# Patient Record
Sex: FEMALE | Race: OTHER | HISPANIC OR LATINO | Employment: FULL TIME | URBAN - METROPOLITAN AREA
[De-identification: names, ages, dates, MRNs, and addresses within clinical notes are randomized per-mention and may not be internally consistent; named-entity substitution may affect disease eponyms.]

---

## 2023-07-31 ENCOUNTER — APPOINTMENT (EMERGENCY)
Dept: CT IMAGING | Facility: HOSPITAL | Age: 53
End: 2023-07-31
Payer: COMMERCIAL

## 2023-07-31 ENCOUNTER — APPOINTMENT (OUTPATIENT)
Dept: NON INVASIVE DIAGNOSTICS | Facility: HOSPITAL | Age: 53
End: 2023-07-31
Payer: COMMERCIAL

## 2023-07-31 ENCOUNTER — HOSPITAL ENCOUNTER (OUTPATIENT)
Facility: HOSPITAL | Age: 53
Setting detail: OBSERVATION
Discharge: HOME | End: 2023-08-01
Attending: EMERGENCY MEDICINE | Admitting: INTERNAL MEDICINE
Payer: COMMERCIAL

## 2023-07-31 ENCOUNTER — APPOINTMENT (OUTPATIENT)
Dept: MRI IMAGING | Facility: HOSPITAL | Age: 53
End: 2023-07-31
Payer: COMMERCIAL

## 2023-07-31 DIAGNOSIS — R00.2 PALPITATIONS: Primary | ICD-10-CM

## 2023-07-31 DIAGNOSIS — R29.90 STROKE-LIKE SYMPTOMS: ICD-10-CM

## 2023-07-31 DIAGNOSIS — I63.9 STROKE (CEREBRUM) (HCC): ICD-10-CM

## 2023-07-31 LAB
2HR DELTA HS TROPONIN: >0 NG/L
4HR DELTA HS TROPONIN: >0 NG/L
ANION GAP SERPL CALCULATED.3IONS-SCNC: 7 MMOL/L
AORTIC ROOT: 3 CM
AORTIC VALVE MEAN VELOCITY: 10.3 M/S
APICAL FOUR CHAMBER EJECTION FRACTION: 61 %
APTT PPP: 25 SECONDS (ref 23–37)
ATRIAL RATE: 83 BPM
AV AREA BY CONTINUOUS VTI: 1.6 CM2
AV AREA PEAK VELOCITY: 1.6 CM2
AV LVOT MEAN GRADIENT: 2 MMHG
AV LVOT PEAK GRADIENT: 3 MMHG
AV MEAN GRADIENT: 4 MMHG
AV PEAK GRADIENT: 6 MMHG
AV VALVE AREA: 1.98 CM2
AV VELOCITY RATIO: 0.69
BUN SERPL-MCNC: 10 MG/DL (ref 5–25)
CALCIUM SERPL-MCNC: 9 MG/DL (ref 8.4–10.2)
CARDIAC TROPONIN I PNL SERPL HS: 2 NG/L
CARDIAC TROPONIN I PNL SERPL HS: 2 NG/L
CARDIAC TROPONIN I PNL SERPL HS: <2 NG/L
CHLORIDE SERPL-SCNC: 106 MMOL/L (ref 96–108)
CO2 SERPL-SCNC: 25 MMOL/L (ref 21–32)
CREAT SERPL-MCNC: 0.84 MG/DL (ref 0.6–1.3)
DOP CALC AO PEAK VEL: 1.27 M/S
DOP CALC AO VTI: 23.87 CM
DOP CALC LVOT AREA: 2.83 CM2
DOP CALC LVOT CARDIAC INDEX: 1.34 L/MIN/M2
DOP CALC LVOT CARDIAC OUTPUT: 2.69 L/MIN
DOP CALC LVOT DIAMETER: 1.9 CM
DOP CALC LVOT PEAK VEL VTI: 16.64 CM
DOP CALC LVOT PEAK VEL: 0.88 M/S
DOP CALC LVOT STROKE INDEX: 18.5 ML/M2
DOP CALC LVOT STROKE VOLUME: 47.16 CM3
E WAVE DECELERATION TIME: 215 MS
ERYTHROCYTE [DISTWIDTH] IN BLOOD BY AUTOMATED COUNT: 12.6 % (ref 11.6–15.1)
FLUAV RNA RESP QL NAA+PROBE: NEGATIVE
FLUBV RNA RESP QL NAA+PROBE: NEGATIVE
FRACTIONAL SHORTENING: 30 (ref 28–44)
GFR SERPL CREATININE-BSD FRML MDRD: 80 ML/MIN/1.73SQ M
GLUCOSE SERPL-MCNC: 118 MG/DL (ref 65–140)
GLUCOSE SERPL-MCNC: 118 MG/DL (ref 65–140)
HCT VFR BLD AUTO: 39.5 % (ref 34.8–46.1)
HGB BLD-MCNC: 13.1 G/DL (ref 11.5–15.4)
INR PPP: 0.87 (ref 0.84–1.19)
INTERVENTRICULAR SEPTUM IN DIASTOLE (PARASTERNAL SHORT AXIS VIEW): 1 CM
INTERVENTRICULAR SEPTUM: 1 CM (ref 0.6–1.1)
LAAS-AP4: 16.1 CM2
LEFT ATRIUM AREA SYSTOLE SINGLE PLANE A4C: 15.6 CM2
LEFT ATRIUM SIZE: 3.1 CM
LEFT INTERNAL DIMENSION IN SYSTOLE: 2.6 CM (ref 2.1–4)
LEFT VENTRICULAR INTERNAL DIMENSION IN DIASTOLE: 3.7 CM (ref 3.5–6)
LEFT VENTRICULAR POSTERIOR WALL IN END DIASTOLE: 1 CM
LEFT VENTRICULAR STROKE VOLUME: 33 ML
LVSV (TEICH): 33 ML
MCH RBC QN AUTO: 29.3 PG (ref 26.8–34.3)
MCHC RBC AUTO-ENTMCNC: 33.2 G/DL (ref 31.4–37.4)
MCV RBC AUTO: 88 FL (ref 82–98)
MV E'TISSUE VEL-LAT: 15 CM/S
MV E'TISSUE VEL-SEP: 8 CM/S
MV PEAK A VEL: 0.65 M/S
MV PEAK E VEL: 58 CM/S
MV STENOSIS PRESSURE HALF TIME: 62 MS
MV VALVE AREA P 1/2 METHOD: 3.55
P AXIS: 50 DEGREES
PLATELET # BLD AUTO: 335 THOUSANDS/UL (ref 149–390)
PMV BLD AUTO: 9.4 FL (ref 8.9–12.7)
POTASSIUM SERPL-SCNC: 3.6 MMOL/L (ref 3.5–5.3)
PR INTERVAL: 134 MS
PROTHROMBIN TIME: 12.2 SECONDS (ref 11.6–14.5)
QRS AXIS: -1 DEGREES
QRSD INTERVAL: 74 MS
QT INTERVAL: 386 MS
QTC INTERVAL: 453 MS
RBC # BLD AUTO: 4.47 MILLION/UL (ref 3.81–5.12)
RIGHT ATRIUM AREA SYSTOLE A4C: 15.5 CM2
RIGHT VENTRICLE ID DIMENSION: 3.9 CM
RSV RNA RESP QL NAA+PROBE: NEGATIVE
SARS-COV-2 RNA RESP QL NAA+PROBE: NEGATIVE
SL CV LV EF: 60
SL CV PED ECHO LEFT VENTRICLE DIASTOLIC VOLUME (MOD BIPLANE) 2D: 58 ML
SL CV PED ECHO LEFT VENTRICLE SYSTOLIC VOLUME (MOD BIPLANE) 2D: 26 ML
SODIUM SERPL-SCNC: 138 MMOL/L (ref 135–147)
T WAVE AXIS: 38 DEGREES
TR MAX PG: 22 MMHG
TR PEAK VELOCITY: 2.3 M/S
TRICUSPID ANNULAR PLANE SYSTOLIC EXCURSION: 1.9 CM
TRICUSPID VALVE PEAK REGURGITATION VELOCITY: 2.34 M/S
TSH SERPL DL<=0.05 MIU/L-ACNC: 1.59 UIU/ML (ref 0.45–4.5)
VENTRICULAR RATE: 83 BPM
WBC # BLD AUTO: 6.99 THOUSAND/UL (ref 4.31–10.16)

## 2023-07-31 PROCEDURE — 99244 OFF/OP CNSLTJ NEW/EST MOD 40: CPT | Performed by: PSYCHIATRY & NEUROLOGY

## 2023-07-31 PROCEDURE — 84443 ASSAY THYROID STIM HORMONE: CPT | Performed by: EMERGENCY MEDICINE

## 2023-07-31 PROCEDURE — 70551 MRI BRAIN STEM W/O DYE: CPT

## 2023-07-31 PROCEDURE — 0241U HB NFCT DS VIR RESP RNA 4 TRGT: CPT

## 2023-07-31 PROCEDURE — 85730 THROMBOPLASTIN TIME PARTIAL: CPT

## 2023-07-31 PROCEDURE — 99285 EMERGENCY DEPT VISIT HI MDM: CPT

## 2023-07-31 PROCEDURE — 93306 TTE W/DOPPLER COMPLETE: CPT

## 2023-07-31 PROCEDURE — 36415 COLL VENOUS BLD VENIPUNCTURE: CPT

## 2023-07-31 PROCEDURE — 93306 TTE W/DOPPLER COMPLETE: CPT | Performed by: STUDENT IN AN ORGANIZED HEALTH CARE EDUCATION/TRAINING PROGRAM

## 2023-07-31 PROCEDURE — 99285 EMERGENCY DEPT VISIT HI MDM: CPT | Performed by: EMERGENCY MEDICINE

## 2023-07-31 PROCEDURE — 80061 LIPID PANEL: CPT | Performed by: INTERNAL MEDICINE

## 2023-07-31 PROCEDURE — 99222 1ST HOSP IP/OBS MODERATE 55: CPT | Performed by: INTERNAL MEDICINE

## 2023-07-31 PROCEDURE — 84484 ASSAY OF TROPONIN QUANT: CPT

## 2023-07-31 PROCEDURE — 93005 ELECTROCARDIOGRAM TRACING: CPT

## 2023-07-31 PROCEDURE — 82948 REAGENT STRIP/BLOOD GLUCOSE: CPT

## 2023-07-31 PROCEDURE — 93010 ELECTROCARDIOGRAM REPORT: CPT | Performed by: STUDENT IN AN ORGANIZED HEALTH CARE EDUCATION/TRAINING PROGRAM

## 2023-07-31 PROCEDURE — 80048 BASIC METABOLIC PNL TOTAL CA: CPT

## 2023-07-31 PROCEDURE — 96374 THER/PROPH/DIAG INJ IV PUSH: CPT

## 2023-07-31 PROCEDURE — 70496 CT ANGIOGRAPHY HEAD: CPT

## 2023-07-31 PROCEDURE — 85027 COMPLETE CBC AUTOMATED: CPT

## 2023-07-31 PROCEDURE — NC001 PR NO CHARGE: Performed by: PSYCHIATRY & NEUROLOGY

## 2023-07-31 PROCEDURE — 83036 HEMOGLOBIN GLYCOSYLATED A1C: CPT | Performed by: INTERNAL MEDICINE

## 2023-07-31 PROCEDURE — 85610 PROTHROMBIN TIME: CPT

## 2023-07-31 PROCEDURE — 70498 CT ANGIOGRAPHY NECK: CPT

## 2023-07-31 RX ORDER — ENOXAPARIN SODIUM 100 MG/ML
40 INJECTION SUBCUTANEOUS DAILY
Status: DISCONTINUED | OUTPATIENT
Start: 2023-07-31 | End: 2023-08-01 | Stop reason: HOSPADM

## 2023-07-31 RX ORDER — SIMETHICONE 80 MG
80 TABLET,CHEWABLE ORAL 4 TIMES DAILY PRN
Status: DISCONTINUED | OUTPATIENT
Start: 2023-07-31 | End: 2023-08-01 | Stop reason: HOSPADM

## 2023-07-31 RX ORDER — LORAZEPAM 2 MG/ML
1 INJECTION INTRAMUSCULAR ONCE
Status: COMPLETED | OUTPATIENT
Start: 2023-07-31 | End: 2023-07-31

## 2023-07-31 RX ORDER — ASPIRIN 325 MG
325 TABLET ORAL ONCE
Status: COMPLETED | OUTPATIENT
Start: 2023-07-31 | End: 2023-07-31

## 2023-07-31 RX ORDER — POLYETHYLENE GLYCOL 3350 17 G/17G
17 POWDER, FOR SOLUTION ORAL DAILY PRN
Status: DISCONTINUED | OUTPATIENT
Start: 2023-07-31 | End: 2023-08-01 | Stop reason: HOSPADM

## 2023-07-31 RX ORDER — ONDANSETRON 2 MG/ML
4 INJECTION INTRAMUSCULAR; INTRAVENOUS EVERY 6 HOURS PRN
Status: DISCONTINUED | OUTPATIENT
Start: 2023-07-31 | End: 2023-08-01 | Stop reason: HOSPADM

## 2023-07-31 RX ORDER — ACETAMINOPHEN 325 MG/1
650 TABLET ORAL EVERY 4 HOURS PRN
Status: DISCONTINUED | OUTPATIENT
Start: 2023-07-31 | End: 2023-08-01 | Stop reason: HOSPADM

## 2023-07-31 RX ORDER — ASPIRIN 81 MG/1
81 TABLET, CHEWABLE ORAL DAILY
Status: DISCONTINUED | OUTPATIENT
Start: 2023-08-01 | End: 2023-08-01 | Stop reason: HOSPADM

## 2023-07-31 RX ORDER — ATORVASTATIN CALCIUM 40 MG/1
40 TABLET, FILM COATED ORAL EVERY EVENING
Status: DISCONTINUED | OUTPATIENT
Start: 2023-07-31 | End: 2023-08-01 | Stop reason: HOSPADM

## 2023-07-31 RX ADMIN — ASPIRIN 325 MG ORAL TABLET 325 MG: 325 PILL ORAL at 11:51

## 2023-07-31 RX ADMIN — ENOXAPARIN SODIUM 40 MG: 40 INJECTION SUBCUTANEOUS at 12:56

## 2023-07-31 RX ADMIN — LORAZEPAM 1 MG: 2 INJECTION INTRAMUSCULAR; INTRAVENOUS at 11:15

## 2023-07-31 RX ADMIN — ATORVASTATIN CALCIUM 40 MG: 40 TABLET, FILM COATED ORAL at 18:28

## 2023-07-31 RX ADMIN — IOHEXOL 85 ML: 350 INJECTION, SOLUTION INTRAVENOUS at 11:05

## 2023-07-31 RX ADMIN — ACETAMINOPHEN 650 MG: 325 TABLET ORAL at 18:28

## 2023-07-31 NOTE — ED PROVIDER NOTES
History  Chief Complaint   Patient presents with   • Dizziness     Dizziness that started at 8am, left sided facial tingling that started at 9am with left leg weakness. Pt was at work when symptoms started. Pt has hx of anxiety and anemia     69-year-old female presenting emerged department with 4:30 AM onset of left leg numbness and palpitations that has since progressed to left facial tingling and left upper extremity tingling as well. Also notes left facial droop. Having some dizziness associated with this. Feels anxious. No history of anxiety. No nausea vomit diarrhea pain no chest pain shortness breath no vision changes. Prior to Admission Medications   Prescriptions Last Dose Informant Patient Reported? Taking? Cholecalciferol (VITAMIN D PO) 2023  Yes Yes   Sig: Take by mouth   Cyanocobalamin (B-12 PO) 2023  Yes Yes   Sig: Take by mouth   IRON PO 2023  Yes Yes   Sig: Take by mouth   famotidine (PEPCID) 20 mg tablet   No No   Sig: Take 1 tablet by mouth 2 (two) times a day for 28 doses   lidocaine (LIDODERM) 5 % Not Taking  No No   Sig: Apply 1 patch topically daily Remove & Discard patch within 12 hours or as directed by MD   Patient not taking: Reported on 2023   methocarbamol (ROBAXIN) 500 mg tablet Not Taking  No No   Sig: Take 1 tablet (500 mg total) by mouth 2 (two) times a day as needed for muscle spasms   Patient not taking: Reported on 2023   predniSONE 10 mg tablet Not Taking  No No   Sig: Take 5 tabs po x 2 days; 4 tabs po x 2 days; 3 tabs po x 1 day; 2 tabs po x 1 day; 1 tab po x 1 day. Patient not taking: Reported on 2023      Facility-Administered Medications: None       History reviewed. No pertinent past medical history. Past Surgical History:   Procedure Laterality Date   •  SECTION     • CHOLECYSTECTOMY     • GASTRIC BYPASS     • TUBAL LIGATION         History reviewed. No pertinent family history.   I have reviewed and agree with the history as documented. E-Cigarette/Vaping   • E-Cigarette Use Never User      E-Cigarette/Vaping Substances     Social History     Tobacco Use   • Smoking status: Never   • Smokeless tobacco: Never   Vaping Use   • Vaping Use: Never used   Substance Use Topics   • Alcohol use: No   • Drug use: Never       Review of Systems   Constitutional: Negative for chills and fever. HENT: Negative for ear pain and sore throat. Eyes: Negative for pain and visual disturbance. Respiratory: Negative for cough and shortness of breath. Cardiovascular: Negative for chest pain and palpitations. Gastrointestinal: Negative for abdominal pain and vomiting. Genitourinary: Negative for dysuria and hematuria. Musculoskeletal: Negative for arthralgias and back pain. Skin: Negative for color change and rash. Neurological: Positive for dizziness, weakness and numbness. Negative for seizures and syncope. All other systems reviewed and are negative. Physical Exam  Physical Exam  Vitals and nursing note reviewed. Constitutional:       General: She is not in acute distress. Appearance: She is well-developed. HENT:      Head: Normocephalic and atraumatic. Eyes:      Conjunctiva/sclera: Conjunctivae normal.   Cardiovascular:      Rate and Rhythm: Normal rate and regular rhythm. Heart sounds: No murmur heard. Pulmonary:      Effort: Pulmonary effort is normal. No respiratory distress. Breath sounds: Normal breath sounds. Abdominal:      Palpations: Abdomen is soft. Tenderness: There is no abdominal tenderness. Musculoskeletal:         General: No swelling. Cervical back: Neck supple. Skin:     General: Skin is warm and dry. Capillary Refill: Capillary refill takes less than 2 seconds. Neurological:      Mental Status: She is alert and oriented to person, place, and time. Cranial Nerves: Cranial nerve deficit present. Motor: Weakness present.       Comments: NIH stroke scale of 2 due to left facial numbness as well as mild left facial droop. 5-5 strength of bilateral upper and lower extremities. Sensation to light touch of bilateral upper and lower Extremities.    Psychiatric:         Mood and Affect: Mood normal.         Vital Signs  ED Triage Vitals   Temperature Pulse Respirations Blood Pressure SpO2   07/31/23 1044 07/31/23 1044 07/31/23 1044 07/31/23 1044 07/31/23 1044   98.6 °F (37 °C) 77 20 129/67 100 %      Temp Source Heart Rate Source Patient Position - Orthostatic VS BP Location FiO2 (%)   07/31/23 1044 07/31/23 1044 07/31/23 1044 07/31/23 1044 --   Tympanic Monitor Lying Left arm       Pain Score       07/31/23 1055       No Pain           Vitals:    07/31/23 1118 07/31/23 1241 07/31/23 1341 07/31/23 1441   BP: 134/84 111/75 101/58 104/66   Pulse: 71 68 78 77   Patient Position - Orthostatic VS: Lying Lying Lying Lying         Visual Acuity  Visual Acuity    Flowsheet Row Most Recent Value   L Pupil Size (mm) 3   R Pupil Size (mm) 3   L Pupil Shape Round   R Pupil Shape Round          ED Medications  Medications   acetaminophen (TYLENOL) tablet 650 mg (has no administration in time range)   polyethylene glycol (MIRALAX) packet 17 g (has no administration in time range)   ondansetron (ZOFRAN) injection 4 mg (has no administration in time range)   simethicone (MYLICON) chewable tablet 80 mg (has no administration in time range)   atorvastatin (LIPITOR) tablet 40 mg (has no administration in time range)   enoxaparin (LOVENOX) subcutaneous injection 40 mg (40 mg Subcutaneous Given 7/31/23 1256)   aspirin chewable tablet 81 mg (has no administration in time range)   LORazepam (ATIVAN) injection 1 mg (1 mg Intravenous Given 7/31/23 1115)   iohexol (OMNIPAQUE) 350 MG/ML injection (SINGLE-DOSE) 85 mL (85 mL Intravenous Given 7/31/23 1105)   aspirin tablet 325 mg (325 mg Oral Given 7/31/23 1151)       Diagnostic Studies  Results Reviewed     Procedure Component Value Units Date/Time    HS Troponin I 4hr [[id number]] Collected: 07/31/23 1439    Lab Status: In process Specimen: Blood from Arm, Right Updated: 07/31/23 1446    HS Troponin I 2hr [[id number]]  (Normal) Collected: 07/31/23 1310    Lab Status: Final result Specimen: Blood from Arm, Right Updated: 07/31/23 1343     hs TnI 2hr 2 ng/L      Delta 2hr hsTnI >0 ng/L     FLU/RSV/COVID - if FLU/RSV clinically relevant [[id number]]  (Normal) Collected: 07/31/23 1112    Lab Status: Final result Specimen: Nares from Nose Updated: 07/31/23 1205     SARS-CoV-2 Negative     INFLUENZA A PCR Negative     INFLUENZA B PCR Negative     RSV PCR Negative    Narrative:      FOR PEDIATRIC PATIENTS - copy/paste COVID Guidelines URL to browser: [email or web address]    SARS-CoV-2 assay is a Nucleic Acid Amplification assay intended for the  qualitative detection of nucleic acid from SARS-CoV-2 in nasopharyngeal  swabs. Results are for the presumptive identification of SARS-CoV-2 RNA. Positive results are indicative of infection with SARS-CoV-2, the virus  causing COVID-19, but do not rule out bacterial infection or co-infection  with other viruses. Laboratories within the [facility] and its  territories are required to report all positive results to the appropriate  public health authorities. Negative results do not preclude SARS-CoV-2  infection and should not be used as the sole basis for treatment or other  patient management decisions. Negative results must be combined with  clinical observations, patient history, and epidemiological information. This test has not been FDA cleared or approved. This test has been authorized by FDA under an Emergency Use Authorization  (EUA).  This test is only authorized for the duration of time the  declaration that circumstances exist justifying the authorization of the  emergency use of an in vitro diagnostic tests for detection of SARS-CoV-2  virus and/or diagnosis of COVID-19 infection under section 564(b)(1) of  the Act, 21 U. S.C. 183FXH-8(U)(2), unless the authorization is terminated  or revoked sooner. The test has been validated but independent review by FDA  and CLIA is pending. Test performed using [employer] GeneXpert: This RT-PCR assay targets N2,  a region unique to SARS-CoV-2. A conserved region in the E-gene was chosen  for pan-Sarbecovirus detection which includes SARS-CoV-2. According to CMS-2020-01-R, this platform meets the definition of high-throughput technology.     TSH, 3rd generation with Free T4 reflex [[id number]]  (Normal) Collected: 07/31/23 1054    Lab Status: Final result Specimen: Blood from Arm, Left Updated: 07/31/23 1149     TSH 3RD GENERATON 1.594 uIU/mL     HS Troponin 0hr (reflex protocol) [[id number]]  (Normal) Collected: 07/31/23 1054    Lab Status: Final result Specimen: Blood from Arm, Left Updated: 07/31/23 1124     hs TnI 0hr <2 ng/L     Fingerstick Glucose (POCT) [[id number]]  (Normal) Collected: 07/31/23 1052    Lab Status: Final result Updated: 07/31/23 1121     POC Glucose 118 mg/dl     Basic metabolic panel [[id number]] Collected: 07/31/23 1054    Lab Status: Final result Specimen: Blood from Arm, Left Updated: 07/31/23 1118     Sodium 138 mmol/L      Potassium 3.6 mmol/L      Chloride 106 mmol/L      CO2 25 mmol/L      ANION GAP 7 mmol/L      BUN 10 mg/dL      Creatinine 0.84 mg/dL      Glucose 118 mg/dL      Calcium 9.0 mg/dL      eGFR 80 ml/min/1.73sq m     Narrative:      [place] guidelines for Chronic Kidney Disease (CKD):   •  Stage 1 with normal or high GFR (GFR > 90 mL/min/1.73 square meters)  •  Stage 2 Mild CKD (GFR = 60-89 mL/min/1.73 square meters)  •  Stage 3A Moderate CKD (GFR = 45-59 mL/min/1.73 square meters)  •  Stage 3B Moderate CKD (GFR = 30-44 mL/min/1.73 square meters)  •  Stage 4 Severe CKD (GFR = 15-29 mL/min/1.73 square meters)  •  Stage 5 End Stage CKD (GFR <15 mL/min/1.73 square meters)  Note: GFR calculation is accurate only with a steady state creatinine    Protime-INR [[id number]]  (Normal) Collected: 07/31/23 1054    Lab Status: Final result Specimen: Blood from Arm, Left Updated: 07/31/23 1112     Protime 12.2 seconds      INR 0.87    APTT [[id number]]  (Normal) Collected: 07/31/23 1054    Lab Status: Final result Specimen: Blood from Arm, Left Updated: 07/31/23 1112     PTT 25 seconds     CBC and Platelet [[id number]]  (Normal) Collected: 07/31/23 1054    Lab Status: Final result Specimen: Blood from Arm, Left Updated: 07/31/23 1101     WBC 6.99 Thousand/uL      RBC 4.47 Million/uL      Hemoglobin 13.1 g/dL      Hematocrit 39.5 %      MCV 88 fL      MCH 29.3 pg      MCHC 33.2 g/dL      RDW 12.6 %      Platelets 925 Thousands/uL      MPV 9.4 fL                  MRI brain wo contrast   Final Result by HELENA Scanlon MD (07/31 1253)      No acute intracranial pathology. Workstation performed: [id number]         CTA stroke alert (head/neck)   Final Result by Ryan Li MD (07/31 1120)      1. Patent major vessels of the [place] of [place] without high-grade stenosis. No aneurysm. 2.  No hemodynamically significant stenosis of cervical carotid and vertebral arteries. Findings were directly discussed with Dr. Lucas Childress at 11:10 a.m. Workstation performed: [id number]         CT stroke alert brain   Final Result by Ryan Li MD (07/31 1117)      No acute intracranial CT abnormality. Findings were directly discussed with Dr. Lucas Childress at 11:10 a.m. Workstation performed: [id number]                    Procedures  Procedures         ED Course                               SBIRT 22yo+    Flowsheet Row Most Recent Value   Initial Alcohol Screen: US AUDIT-C     1. How often do you have a drink containing alcohol? 0 Filed at: 07/31/2023 1117   2.  How many drinks containing alcohol do you have on a typical day you are drinking? 0 Filed at: 07/31/2023 1117   3a. Male UNDER 65: How often do you have five or more drinks on one occasion? 0 Filed at: 07/31/2023 1117   3b. FEMALE Any Age, or MALE 65+: How often do you have 4 or more drinks on one occassion? 0 Filed at: 07/31/2023 1117   Audit-C Score 0 Filed at: 07/31/2023 1117   BETTY: How many times in the past year have you. .. Used an illegal drug or used a prescription medication for non-medical reasons? Never Filed at: 07/31/2023 1117                    Medical Decision Making  71-year-old female presenting emerged department with left facial droop and left facial numbness or tingling. Concern for stroke. CTA head and neck unremarkable. Case discussed with neurologist who recommends admission. Amount and/or Complexity of Data Reviewed  Labs: ordered. Risk  Prescription drug management. Decision regarding hospitalization. Disposition  Final diagnoses:   Palpitations   Stroke (cerebrum) ([address])     Time reflects when diagnosis was documented in both MDM as applicable and the Disposition within this note     Time User Action Codes Description Comment    7/31/2023 10:50 AM Derrick Pillion Add [R00.2] Palpitations     7/31/2023 11:37 AM Beto Goo Add [R29.90] Stroke-like symptoms     7/31/2023 11:41 AM Cydne Cocking Add [I63.9] Stroke (cerebrum) [facility])       ED Disposition     ED Disposition   Admit    Condition   Stable    Date/Time   Mon Jul 31, 2023 1141    Comment   Case was discussed with Dr. Josue James and the patient's admission status was agreed to be Admission Status: inpatient status to the service of Dr. Josue James .            Follow-up Information    None         Current Discharge Medication List      CONTINUE these medications which have NOT CHANGED    Details   Cholecalciferol (VITAMIN D PO) Take by mouth      Cyanocobalamin (B-12 PO) Take by mouth      IRON PO Take by mouth      famotidine (PEPCID) 20 mg tablet Take 1 tablet by mouth 2 (two) times a day for 28 doses  Qty: 28 tablet, Refills: 0      lidocaine (LIDODERM) 5 % Apply 1 patch topically daily Remove & Discard patch within 12 hours or as directed by MD  Qty: 6 patch, Refills: 0    Associated Diagnoses: Lumbar back pain with radiculopathy affecting right lower extremity      methocarbamol (ROBAXIN) 500 mg tablet Take 1 tablet (500 mg total) by mouth 2 (two) times a day as needed for muscle spasms  Qty: 20 tablet, Refills: 0    Associated Diagnoses: Lumbar back pain with radiculopathy affecting right lower extremity      predniSONE 10 mg tablet Take 5 tabs po x 2 days; 4 tabs po x 2 days; 3 tabs po x 1 day; 2 tabs po x 1 day; 1 tab po x 1 day. Qty: 24 tablet, Refills: 0    Associated Diagnoses: Lumbar back pain with radiculopathy affecting right lower extremity             No discharge procedures on file.     PDMP Review     None          ED Provider  Electronically Signed by           Gonzalo Son MD  07/31/23 6311

## 2023-07-31 NOTE — ASSESSMENT & PLAN NOTE
Presents to ED with left-sided facial numbness, arm and leg tingling and numbness   Symptoms noticed around 4:30 AM this morning, last well-known time was last night.   Patient was out of TNK window  • CTA head/neck showed no high-grade stenosis or aneurysm  • Admitted under stroke pathway  • Permissive hypertension  • DVT prophylaxis with Lovenox  • PT/OT/ST  •  mg once followed by 81 mg daily  • Atorvastatin 40 mg daily   • lipid panel/Hgb A1C ordered   • Telemetry, neuro checks   • Neurology consult; recommendations appreciated  • MRI brain and echocardiogram ordered

## 2023-07-31 NOTE — H&P
[facility]  H&P  Name: Shadi Torres 47 y.o. female I MRN: [id number]  Unit/Bed#: 7T [facility] 704-01 I Date of Admission: 7/31/2023   Date of Service: 7/31/2023 I Hospital Day: 0      Assessment/Plan   * Stroke-like symptoms  Assessment & Plan  Presents to ED with left-sided facial numbness, arm and leg tingling and numbness   Symptoms noticed around 4:30 AM this morning, last well-known time was last night. Patient was out of TNK window  • CTA head/neck showed no high-grade stenosis or aneurysm  • Admitted under stroke pathway  • Permissive hypertension  • DVT prophylaxis with Lovenox  • PT/OT/ST  •  mg once followed by 81 mg daily  • Atorvastatin 40 mg daily   • lipid panel/Hgb A1C ordered   • Telemetry, neuro checks   • Neurology consult; recommendations appreciated  • MRI brain and echocardiogram ordered             VTE Prophylaxis: Enoxaparin (Lovenox)  / sequential compression device   Code Status: Full code  POLST: There is no POLST form on file for this patient (pre-hospital)  Discussion with family: Discussed with patient    Anticipated Length of Stay:  Patient will be admitted on an Observation basis with an anticipated length of stay of less than 2 midnights. Justification for Hospital Stay: Strokelike symptoms    Total Time for Visit, including Counseling / Coordination of Care: 45 minutes. Greater than 50% of this total time spent on direct patient counseling and coordination of care. Chief Complaint:   Left-sided numbness with left facial droop    History of Present Illness:    Shadi Torres is a 47 y.o. female who presents with left-sided facial numbness with left-sided arm and leg tingling and numbness. She noticed symptoms around 4:30 AM this morning. Patient went to work however she felt worse with symptoms including light headache, loud noise in her left ear and palpitation. She felt weakness, tingling and numbness in left side of her body.   Patient reported she started crying after that. Patient denies any anxiety issue or medications. The last well-known time was last night therefore patient was out of TNK window. Patient had varicose vein and underwent surgery before. Since then she had pain syndrome. In ED, stroke alert called and neurologist recommended admission under stroke pathway. CTA head and neck showed no high-grade stenosis or aneurysm. Review of Systems:    Review of Systems Patient was seen and examined at bedside. The patient has left-sided tingling and numbness with left facial tingling. She denies any fever, chills, chest pain, palpitation, shortness of breath, N/V, abd pain. Past Medical and Surgical History:     History reviewed. No pertinent past medical history. Past Surgical History:   Procedure Laterality Date   •  SECTION     • CHOLECYSTECTOMY     • GASTRIC BYPASS     • TUBAL LIGATION         Meds/Allergies:    Prior to Admission medications    Medication Sig Start Date End Date Taking? Authorizing Provider   Cholecalciferol (VITAMIN D PO) Take by mouth    Historical Provider, MD   Cyanocobalamin (B-12 PO) Take by mouth    Historical Provider, MD   famotidine (PEPCID) 20 mg tablet Take 1 tablet by mouth 2 (two) times a day for 28 doses 17  Milka Costello MD   IRON PO Take by mouth    Historical Provider, MD   lidocaine (LIDODERM) 5 % Apply 1 patch topically daily Remove & Discard patch within 12 hours or as directed by MD 20   MELISSA Austin   methocarbamol (ROBAXIN) 500 mg tablet Take 1 tablet (500 mg total) by mouth 2 (two) times a day as needed for muscle spasms 20   MELISSA Austin   predniSONE 10 mg tablet Take 5 tabs po x 2 days; 4 tabs po x 2 days; 3 tabs po x 1 day; 2 tabs po x 1 day; 1 tab po x 1 day. 20   MELISSA Austin     I have reviewed home medications with patient personally.     Allergies: No Known Allergies    Social History:     Marital Status: /Civil Union   Patient Pre-hospital Living Situation: Lives at home  Patient Pre-hospital Level of Mobility: Independent  Patient Pre-hospital Diet Restrictions: No restrictions  Substance Use History:   Social History     Substance and Sexual Activity   Alcohol Use No     Social History     Tobacco Use   Smoking Status Never   Smokeless Tobacco Never     Social History     Substance and Sexual Activity   Drug Use Never       Family History:    History reviewed. No pertinent family history. Physical Exam:     Vitals:   Blood Pressure: 104/66 (07/31/23 1441)  Pulse: 77 (07/31/23 1441)  Temperature: 98.4 °F (36.9 °C) (07/31/23 1441)  Temp Source: Temporal (07/31/23 1441)  Respirations: 20 (07/31/23 1441)  Height: 5' 4" (162.6 cm) (07/31/23 1241)  Weight - Scale: 95 kg (209 lb 7 oz) (07/31/23 1241)  SpO2: 98 % (07/31/23 1441)    Physical Exam    General: no acute distress  HEENT: NC/AT, PERRL, EOM - normal  Neck: Supple  Pulm/Chest: Normal chest wall expansion, clear breath sounds on both side  CVS: RRR, normal S1&S2, capillary refill <2s  Abd: soft, non tender, non distended, bowel sounds +  MSK: move all 4 extremities spontaneously  Skin: warm  CNS: Left-sided facial tingling and numbness. Left-sided upper and lower extremity weakness 4/5. Left-sided of the body tingling and numbness. No facial droop. Additional Data:     Lab Results: I have personally reviewed pertinent reports.       Results from last 7 days   Lab Units 07/31/23  1054   WBC Thousand/uL 6.99   HEMOGLOBIN g/dL 13.1   HEMATOCRIT % 39.5   PLATELETS Thousands/uL 335     Results from last 7 days   Lab Units 07/31/23  1054   SODIUM mmol/L 138   POTASSIUM mmol/L 3.6   CHLORIDE mmol/L 106   CO2 mmol/L 25   BUN mg/dL 10   CREATININE mg/dL 0.84   ANION GAP mmol/L 7   CALCIUM mg/dL 9.0   GLUCOSE RANDOM mg/dL 118     Results from last 7 days   Lab Units 07/31/23  1054   INR  0.87     Results from last 7 days   Lab Units 07/31/23  1052   POC GLUCOSE mg/dl 118               Imaging: I have personally reviewed pertinent reports. MRI brain wo contrast   Final Result by HELENA Neumann MD (07/31 1253)      No acute intracranial pathology. Workstation performed: [id number]         CTA stroke alert (head/neck)   Final Result by Luz Girard MD (07/31 1120)      1. Patent major vessels of the [place] of [place] without high-grade stenosis. No aneurysm. 2.  No hemodynamically significant stenosis of cervical carotid and vertebral arteries. Findings were directly discussed with Dr. Khoi Izquierdo at 11:10 a.m. Workstation performed: [id number]         CT stroke alert brain   Final Result by Luz Girard MD (07/31 1117)      No acute intracranial CT abnormality. Findings were directly discussed with Dr. Khoi Izquierdo at 11:10 a.m. Workstation performed: [id number]             EKG, Pathology, and Other Studies Reviewed on Admission:   · EKG: No recent EKG in [facility]    Allscripts / [facility] Records Reviewed: Yes     ** Please Note: This note has been constructed using a voice recognition system.  **

## 2023-08-01 VITALS
TEMPERATURE: 97.4 F | HEIGHT: 64 IN | DIASTOLIC BLOOD PRESSURE: 65 MMHG | RESPIRATION RATE: 18 BRPM | OXYGEN SATURATION: 97 % | BODY MASS INDEX: 35.76 KG/M2 | WEIGHT: 209.44 LBS | SYSTOLIC BLOOD PRESSURE: 105 MMHG | HEART RATE: 76 BPM

## 2023-08-01 PROBLEM — G45.9 TIA (TRANSIENT ISCHEMIC ATTACK): Status: ACTIVE | Noted: 2023-07-31

## 2023-08-01 LAB
CHOLEST SERPL-MCNC: 219 MG/DL
EST. AVERAGE GLUCOSE BLD GHB EST-MCNC: 120 MG/DL
HBA1C MFR BLD: 5.8 %
HDLC SERPL-MCNC: 53 MG/DL
LDLC SERPL CALC-MCNC: 138 MG/DL (ref 0–100)
TRIGL SERPL-MCNC: 142 MG/DL

## 2023-08-01 PROCEDURE — 99239 HOSP IP/OBS DSCHRG MGMT >30: CPT | Performed by: INTERNAL MEDICINE

## 2023-08-01 PROCEDURE — 92610 EVALUATE SWALLOWING FUNCTION: CPT

## 2023-08-01 PROCEDURE — 97166 OT EVAL MOD COMPLEX 45 MIN: CPT

## 2023-08-01 PROCEDURE — 97162 PT EVAL MOD COMPLEX 30 MIN: CPT

## 2023-08-01 RX ORDER — ATORVASTATIN CALCIUM 40 MG/1
40 TABLET, FILM COATED ORAL EVERY EVENING
Qty: 30 TABLET | Refills: 0 | Status: SHIPPED | OUTPATIENT
Start: 2023-08-01 | End: 2023-08-31

## 2023-08-01 RX ORDER — ASPIRIN 81 MG/1
81 TABLET, CHEWABLE ORAL DAILY
Qty: 30 TABLET | Refills: 0 | Status: SHIPPED | OUTPATIENT
Start: 2023-08-01 | End: 2023-08-31

## 2023-08-01 RX ADMIN — ASPIRIN 81 MG 81 MG: 81 TABLET ORAL at 09:55

## 2023-08-01 NOTE — PLAN OF CARE
Problem: Potential for Falls  Goal: Patient will remain free of falls  Description: INTERVENTIONS:  - Educate patient/family on patient safety including physical limitations  - Instruct patient to call for assistance with activity   - Consult OT/PT to assist with strengthening/mobility   - Keep Call bell within reach  - Keep bed low and locked with side rails adjusted as appropriate  - Keep care items and personal belongings within reach  - Initiate and maintain comfort rounds  - Make Fall Risk Sign visible to staff  - Apply yellow socks and bracelet for high fall risk patients  - Consider moving patient to room near nurses station  Outcome: Progressing     Problem: PAIN - ADULT  Goal: Verbalizes/displays adequate comfort level or baseline comfort level  Description: Interventions:  - Encourage patient to monitor pain and request assistance  - Assess pain using appropriate pain scale  - Administer analgesics based on type and severity of pain and evaluate response  - Implement non-pharmacological measures as appropriate and evaluate response  - Consider cultural and social influences on pain and pain management  - Notify physician/advanced practitioner if interventions unsuccessful or patient reports new pain  Outcome: Progressing     Problem: SAFETY ADULT  Goal: Patient will remain free of falls  Description: INTERVENTIONS:  - Educate patient/family on patient safety including physical limitations  - Instruct patient to call for assistance with activity   - Consult OT/PT to assist with strengthening/mobility   - Keep Call bell within reach  - Keep bed low and locked with side rails adjusted as appropriate  - Keep care items and personal belongings within reach  - Initiate and maintain comfort rounds  - Make Fall Risk Sign visible to staff  - Apply yellow socks and bracelet for high fall risk patients  - Consider moving patient to room near nurses station  Outcome: Progressing  Goal: Maintain or return to baseline ADL function  Description: INTERVENTIONS:  -  Assess patient's ability to carry out ADLs; assess patient's baseline for ADL function and identify physical deficits which impact ability to perform ADLs (bathing, care of mouth/teeth, toileting, grooming, dressing, etc.)  - Assess/evaluate cause of self-care deficits   - Assess range of motion  - Assess patient's mobility; develop plan if impaired  - Assess patient's need for assistive devices and provide as appropriate  - Encourage maximum independence but intervene and supervise when necessary  - Involve family in performance of ADLs  - Assess for home care needs following discharge   - Consider OT consult to assist with ADL evaluation and planning for discharge  - Provide patient education as appropriate  Outcome: Progressing  Goal: Maintains/Returns to pre admission functional level  Description: INTERVENTIONS:  - Perform BMAT or MOVE assessment daily.   - Set and communicate daily mobility goal to care team and patient/family/caregiver.    - Collaborate with rehabilitation services on mobility goals if consulted  - Out of bed for toileting  - Record patient progress and toleration of activity level   Outcome: Progressing     Problem: DISCHARGE PLANNING  Goal: Discharge to home or other facility with appropriate resources  Description: INTERVENTIONS:  - Identify barriers to discharge w/patient and caregiver  - Arrange for needed discharge resources and transportation as appropriate  - Identify discharge learning needs (meds, wound care, etc.)  - Arrange for interpretive services to assist at discharge as needed  - Refer to Case Management Department for coordinating discharge planning if the patient needs post-hospital services based on physician/advanced practitioner order or complex needs related to functional status, cognitive ability, or social support system  Outcome: Progressing     Problem: Knowledge Deficit  Goal: Patient/family/caregiver demonstrates understanding of disease process, treatment plan, medications, and discharge instructions  Description: Complete learning assessment and assess knowledge base. Interventions:  - Provide teaching at level of understanding  - Provide teaching via preferred learning methods  Outcome: Progressing     Problem: Neurological Deficit  Goal: Neurological status is stable or improving  Description: Interventions:  - Monitor and assess patient's level of consciousness, motor function, sensory function, and level of assistance needed for ADLs. - Monitor and report changes from baseline. Collaborate with interdisciplinary team to initiate plan and implement interventions as ordered. - Provide and maintain a safe environment. - Consider seizure precautions. - Consider fall precautions. - Consider aspiration precautions. - Consider bleeding precautions. Outcome: Progressing     Problem: Activity Intolerance/Impaired Mobility  Goal: Mobility/activity is maintained at optimum level for patient  Description: Interventions:  - Assess and monitor patient  barriers to mobility and need for assistive/adaptive devices. - Assess patient's emotional response to limitations. - Collaborate with interdisciplinary team and initiate plans and interventions as ordered. - Encourage independent activity per ability.  - Maintain proper body alignment. - Perform active/passive rom as tolerated/ordered. - Plan activities to conserve energy.  - Turn patient as appropriate  Outcome: Progressing     Problem: Communication Impairment  Goal: Ability to express needs and understand communication  Description: Assess patient's communication skills and ability to understand information. Patient will demonstrate use of effective communication techniques, alternative methods of communication and understanding even if not able to speak. - Encourage communication and provide alternate methods of communication as needed.   - Collaborate with case management/ for discharge needs. - Include patient/family/caregiver in decisions related to communication. Outcome: Progressing     Problem: Potential for Aspiration  Goal: Non-ventilated patient's risk of aspiration is minimized  Description: Assess and monitor vital signs, respiratory status, and labs (WBC). Monitor for signs of aspiration (tachypnea, cough, rales, wheezing, cyanosis, fever). - Assess and monitor patient's ability to swallow. - Place patient up in chair to eat if possible. - HOB up at 90 degrees to eat if unable to get patient up into chair.  - Supervise patient during oral intake. - Instruct patient/ family to take small bites. - Instruct patient/ family to take small single sips when taking liquids. - Follow patient-specific strategies generated by speech pathologist.  Outcome: Progressing     Problem: Nutrition  Goal: Nutrition/Hydration status is improving  Description: Monitor and assess patient's nutrition/hydration status for malnutrition (ex- brittle hair, bruises, dry skin, pale skin and conjunctiva, muscle wasting, smooth red tongue, and disorientation). Collaborate with interdisciplinary team and initiate plan and interventions as ordered. Monitor patient's weight and dietary intake as ordered or per policy. Utilize nutrition screening tool and intervene per policy. Determine patient's food preferences and provide high-protein, high-caloric foods as appropriate. - Assist patient with eating.  - Allow adequate time for meals.  - Encourage patient to take dietary supplement as ordered. - Collaborate with clinical nutritionist.  - Include patient/family/caregiver in decisions related to nutrition.   Outcome: Progressing

## 2023-08-01 NOTE — UTILIZATION REVIEW
Initial Clinical Review    Admission: Date/Time/Statement:   Admission Orders (From admission, onward)     Ordered        07/31/23 1137  Place in Observation  Once                      Orders Placed This Encounter   Procedures   • Place in Observation     Standing Status:   Standing     Number of Occurrences:   1     Order Specific Question:   Level of Care     Answer:   Med Surg [16]     ED Arrival Information     Expected   -    Arrival   7/31/2023 10:34    Acuity   Emergent            Means of arrival   Walk-In    Escorted by   Self    Service   Hospitalist    Admission type   Emergency            Arrival complaint   heart palpitations           Chief Complaint   Patient presents with   • Dizziness     Dizziness that started at 8am, left sided facial tingling that started at 9am with left leg weakness. Pt was at work when symptoms started. Pt has hx of anxiety and anemia       Initial Presentation: 47 y.o. female who presented self from home to [facility] ED. Admitted in observation status for evaluation and treatment of stroke-like symptoms. Presented w/ L-sided facial numbness, LUE and LLE tingling and numbness, headache, loud noise in L ear, palpitations. On exam, LUE 4/5 strength. Out of TNK window. Plan: permissive HTN, PT/OT, ASA/statin, check lipid panel and HgbA1c, telemetry, neuro checks, check MRI and echo. Neurology consulted. Neurology: NIHSS 2. LKW 7/30 PM. L sided numbness, L sided nasolabial fold loss. Check MRI, ASA/statin, permissive HTN w/ SBP < 180 w/ gradual return to normotension.     ED Triage Vitals   Temperature Pulse Respirations Blood Pressure SpO2   07/31/23 1044 07/31/23 1044 07/31/23 1044 07/31/23 1044 07/31/23 1044   98.6 °F (37 °C) 77 20 129/67 100 %      Temp Source Heart Rate Source Patient Position - Orthostatic VS BP Location FiO2 (%)   07/31/23 1044 07/31/23 1044 07/31/23 1044 07/31/23 1044 --   Tympanic Monitor Lying Left arm       Pain Score       07/31/23 1055 No Pain          Wt Readings from Last 1 Encounters:   07/31/23 95 kg (209 lb 7 oz)     Additional Vital Signs:   Date/Time Temp Pulse Resp BP MAP (mmHg) SpO2 O2 Device   08/01/23 0741 97.4 °F (36.3 °C) Abnormal  76 18 105/65 72 97 % None (Room air)   08/01/23 0341 97.5 °F (36.4 °C) 66 18 105/65 70 98 % None (Room air)   07/31/23 2341 97.9 °F (36.6 °C) 70 16 102/66 -- 99 % None (Room air)   07/31/23 2141 98.1 °F (36.7 °C) 68 18 100/68 -- 96 % None (Room air)   07/31/23 1941 97.7 °F (36.5 °C) 80 18 117/78 90 98 % None (Room air)   07/31/23 1711 97.8 °F (36.6 °C) 85 18 115/69 74 96 % None (Room air)   07/31/23 1511 97.2 °F (36.2 °C) Abnormal  77 18 102/67 73 97 % None (Room air)   07/31/23 1441 98.4 °F (36.9 °C) 77 20 104/66 75 98 % None (Room air)   07/31/23 1341 98.7 °F (37.1 °C) 78 20 101/58 67 99 % None (Room air)   07/31/23 1241 97.6 °F (36.4 °C) 68 20 111/75 85 98 % None (Room air)   07/31/23 1118 -- 71 20 134/84 -- 100 % None (Room air)     Pertinent Labs/Diagnostic Test Results:   MRI brain wo contrast   Final Result by HELENA Ferguson MD (07/31 1253)      No acute intracranial pathology. Workstation performed: [id number]         CTA stroke alert (head/neck)   Final Result by Rosalee Schwab, MD (07/31 1120)      1. Patent major vessels of the [place] of [place] without high-grade stenosis. No aneurysm. 2.  No hemodynamically significant stenosis of cervical carotid and vertebral arteries. Findings were directly discussed with Dr. Jeannie Freeman at 11:10 a.m. Workstation performed: [id number]         CT stroke alert brain   Final Result by Rosalee Schwab, MD (07/31 1117)      No acute intracranial CT abnormality. Findings were directly discussed with Dr. Jeannie Freeman at 11:10 a.m.          Workstation performed: [id number]           Results from last 7 days   Lab Units 07/31/23  1112   SARS-COV-2  Negative     Results from last 7 days   Lab Units 07/31/23  1054   WBC Thousand/uL 6.99   HEMOGLOBIN g/dL 13.1   HEMATOCRIT % 39.5   PLATELETS Thousands/uL 335         Results from last 7 days   Lab Units 07/31/23  1054   SODIUM mmol/L 138   POTASSIUM mmol/L 3.6   CHLORIDE mmol/L 106   CO2 mmol/L 25   ANION GAP mmol/L 7   BUN mg/dL 10   CREATININE mg/dL 0.84   EGFR ml/min/1.73sq m 80   CALCIUM mg/dL 9.0         Results from last 7 days   Lab Units 07/31/23  1052   POC GLUCOSE mg/dl 118     Results from last 7 days   Lab Units 07/31/23  1054   GLUCOSE RANDOM mg/dL 118                             Results from last 7 days   Lab Units 07/31/23  1439 07/31/23  1310 07/31/23  1054   HS TNI 0HR ng/L  --   --  <2   HS TNI 2HR ng/L  --  2  --    HSTNI D2 ng/L  --  >0  --    HS TNI 4HR ng/L 2  --   --    HSTNI D4 ng/L >0  --   --          Results from last 7 days   Lab Units 07/31/23  1054   PROTIME seconds 12.2   INR  0.87   PTT seconds 25     Results from last 7 days   Lab Units 07/31/23  1054   TSH 3RD GENERATON uIU/mL 1.594                                                             Results from last 7 days   Lab Units 07/31/23  1112   INFLUENZA A PCR  Negative   INFLUENZA B PCR  Negative   RSV PCR  Negative                                               ED Treatment:   Medication Administration from 07/31/2023 1034 to 07/31/2023 1226       Date/Time Order Dose Route Action     07/31/2023 1115 EDT LORazepam (ATIVAN) injection 1 mg 1 mg Intravenous Given     07/31/2023 1105 EDT iohexol (OMNIPAQUE) 350 MG/ML injection (SINGLE-DOSE) 85 mL 85 mL Intravenous Given     07/31/2023 1151 EDT aspirin tablet 325 mg 325 mg Oral Given          Admitting Diagnosis: Palpitations [R00.2]  Dizziness [R42]  Stroke (cerebrum) (HCC) [I63.9]  Stroke-like symptoms [R29.90]  Age/Sex: 47 y.o. female  Admission Orders:  Regular Diet. Incentive Spirometry. Baseline NIH stroke scale on admission, reassess Q24H x 2 D. Nursing dysphagia assessment prior to staring diet.   Neuro checks, q1h x4h, q2h x8h, q4h x72h. Telemetry. SCDs. Scheduled Medications:  aspirin, 81 mg, Oral, Daily  atorvastatin, 40 mg, Oral, QPM  enoxaparin, 40 mg, Subcutaneous, Daily    Continuous IV Infusions:      PRN Meds:  acetaminophen, 650 mg, Oral, Q4H PRN  ondansetron, 4 mg, Intravenous, Q6H PRN  polyethylene glycol, 17 g, Oral, Daily PRN  simethicone, 80 mg, Oral, 4x Daily PRN        IP CONSULT TO NEUROLOGY  IP CONSULT TO CASE MANAGEMENT  IP CONSULT TO NUTRITION SERVICES    Network Utilization Review Department  ATTENTION: Please call with any questions or concerns to [phone] and carefully listen to the prompts so that you are directed to the right person. All voicemails are confidential.  [facility] all requests for admission clinical reviews, approved or denied determinations and any other requests to dedicated fax number below belonging to the campus where the patient is receiving treatment.  List of dedicated fax numbers for the Facilities:  [place] DENIALS (Administrative/Medical Necessity) [phone]   [address] [facility] (Maternity/NICU/Pediatrics) [phone]   [address] [place] [address] [phone]   [facility] [address] [facility] [phone]   [address] [facility] [address] [address] [facility] [facility] [address] [place] [address] [id number] [place] [address] [phone]   [address] [facility] [address] [facility]  [address] [phone]

## 2023-08-01 NOTE — ASSESSMENT & PLAN NOTE
Presents to ED with left-sided facial numbness, arm and leg tingling and numbness   Symptoms noticed around 4:30 AM this morning, last well-known time was last night. Patient was out of TNK window  • Acute stroke ruled out   • MRI showed no acute intracranial pathology. • Echocardiogram showed LVEF 60%. Normal systolic function, wall motion and diastolic function. • CTA head/neck showed no high-grade stenosis or aneurysm  • Permissive hypertension for 1st 24hr  • DVT prophylaxis with Lovenox  • PT/OT -recommended no rehab needs   •  mg once followed by 81 mg daily  • Atorvastatin 40 mg daily   • Continue aspirin 81 mg for secondary stroke prevention. • LDL >70, initiated statin and continue  • Highly recommended to call 911 if they strokelike symptoms in the future. • No further testing needed from inpatient neurology standpoint.

## 2023-08-01 NOTE — SPEECH THERAPY NOTE
Speech Pathology Bedside Swallow Evaluation:                    SLP RECOMMENDATIONS:         Diet: Regular         Liquids: Thin liquids         Medications: as best tolerated               Summary:  Pt seen for bedside swallow evaluation in setting of admission under stroke/TIA pathway. MRI of brain and CT of brain were unremarkable for acute process. Pt reports weakness/numbness in L leg and L side of face. Pt reports she feels the weakness/numbness has been improving since yesterday. Facial numbness extends from labial/buccal muscles to obicularis oculi muscles. No facial weakness or decreased ROM appreciated. Pt's conversational speech is clear and fluent. Pt is alert and oriented x4 independently. Pt denies any difficulty with speech or chewing/swallowing. Pt observed with portion of AM meal of Regular/thin liquids with functional mastication/manipulation of bolus, adequate bite-strength, and no overt s/s aspiration. Pt reports occasional premature fullness since having gastric bypass surgery. Pt presents with no s/s suggestive of oral or pharyngeal dysphagia at this time. Recommend continuing current diet. ST not indicated. Please re-consult with any new concerns. Eval only, No f/u tx indicated.      Vitals:    07/31/23 2141 07/31/23 2341 08/01/23 0341 08/01/23 0741   BP: 100/68 102/66 105/65 105/65   BP Location: Left arm Left arm Right arm Left arm   Pulse: 68 70 66 76   Resp: 18 16 18 18   Temp: 98.1 °F (36.7 °C) 97.9 °F (36.6 °C) 97.5 °F (36.4 °C) (!) 97.4 °F (36.3 °C)   TempSrc: Temporal Temporal Temporal Temporal   SpO2: 96% 99% 98% 97%   Weight:       Height:         Lab Results   Component Value Date    WBC 6.99 07/31/2023    HGB 13.1 07/31/2023    HCT 39.5 07/31/2023    MCV 88 07/31/2023     07/31/2023       H&P/Admit info/ pertinent provider notes: (PMH noted above)     "Bessy Ho is a 47 y.o. female who presents with left-sided facial numbness with left-sided arm and leg tingling and numbness. She noticed symptoms around 4:30 AM this morning. Patient went to work however she felt worse with symptoms including light headache, loud noise in her left ear and palpitation. She felt weakness, tingling and numbness in left side of her body. Patient reported she started crying after that. Patient denies any anxiety issue or medications. The last well-known time was last night therefore patient was out of TNK window. Patient had varicose vein and underwent surgery before. Since then she had pain syndrome.     In ED, stroke alert called and neurologist recommended admission under stroke pathway. CTA head and neck showed no high-grade stenosis or aneurysm."    Special Studies:  No recent Chest XR results available for this patient. Previous VBS:  None     Patient's goal: none stated     Did the pt report pain? No   If yes, was nursing notified/was it addressed?     Reason for consult:  R/o aspiration  Determine safest and least restrictive diet    Precautions:  Aspiration      Food allergies:  No Known Allergies     Current diet:  Regular/thin liquids   Premorbid diet:  Regular/thin    O2 requirements:  RA   Voice/Speech:  Intact    Social:  Independent; works FT   Follows commands:  Intact    Cognitive status:  Alert and oriented        Results d/w:  Pt, nursing,

## 2023-08-01 NOTE — DISCHARGE SUMMARY
[facility]  Discharge- Tameka Hollingsworth 1970, 47 y.o. female MRN: [id number]  Unit/Bed#: 7T [facility] 704- Encounter: [id number]  Primary Care Provider: No primary care provider on file. Date and time admitted to hospital: 7/31/2023 10:38 AM    * TIA (transient ischemic attack)  Assessment & Plan  Presents to ED with left-sided facial numbness, arm and leg tingling and numbness   Symptoms noticed around 4:30 AM this morning, last well-known time was last night. Patient was out of TNK window  • Acute stroke ruled out   • MRI showed no acute intracranial pathology. • Echocardiogram showed LVEF 60%. Normal systolic function, wall motion and diastolic function. • CTA head/neck showed no high-grade stenosis or aneurysm  • Permissive hypertension for 1st 24hr  • DVT prophylaxis with Lovenox  • PT/OT -recommended no rehab needs   •  mg once followed by 81 mg daily  • Atorvastatin 40 mg daily   • Continue aspirin 81 mg for secondary stroke prevention. • LDL >70, initiated statin and continue  • Highly recommended to call 911 if they strokelike symptoms in the future. • No further testing needed from inpatient neurology standpoint. Discharging Physician / Practitioner: Beto Krishnan MD  PCP: No primary care provider on file. Admission Date:   Admission Orders (From admission, onward)     Ordered        07/31/23 1137  Place in Observation  Once                      Discharge Date: 08/01/23    Medical Problems     Resolved Problems  Date Reviewed: 8/1/2023   None         Consultations During Hospital Stay:  · Neurologist    Procedures Performed:   · None    Significant Findings / Test Results:   Echo complete w/ contrast if indicated Result Date: 7/31/2023  Narrative: •  Left Ventricle: Left ventricular cavity size is normal. Wall thickness is normal. The left ventricular ejection fraction is 60%.  Systolic function is normal. Wall motion is normal. Diastolic function is normal. •  Right Ventricle: Right ventricular cavity size is normal. Systolic function is normal.     MRI brain wo contrast Result Date: 7/31/2023  Impression: No acute intracranial pathology. Workstation performed: [id number]     CTA stroke alert (head/neck) Result Date: 7/31/2023  Impression: 1. Patent major vessels of the [place] without high-grade stenosis. No aneurysm. 2.  No hemodynamically significant stenosis of cervical carotid and vertebral arteries. Findings were directly discussed with Dr. Torsten Gabriel at 11:10 a.m. Workstation performed: [id number]     CT stroke alert brain Result Date: 7/31/2023  · Impression: No acute intracranial CT abnormality. Findings were directly discussed with Dr. Torsten Gabriel at 11:10 a.m. Workstation performed: [id number]     Incidental Findings:   · See above    Test Results Pending at Discharge (will require follow up): · None     Outpatient Tests Requested:   none    Complications: None    Reason for Admission: Strokelike symptoms    Hospital Course:     Josselin Harmon is a 47 y.o. female patient who originally presented to the hospital on 7/31/2023 due to left-sided facial numbness, arm and leg tingling and numbness most likely due to TIA. Patient was admitted under stroke pathway. Patient was out of TNK window. CTA head and neck showed no high-grade stenosis or aneurysm. MRI showed no acute intracranial pathology. Patient received aspirin loading dose of 325 mg followed by 81 mg daily. LDL was more than 70, patient was initiated on atorvastatin 40 mg daily. Echocardiogram showed LVEF 60%. Normal systolic function, wall motion and diastolic function. Neurologist on board recommended no further inpatient testing needed. PT OT recommended no rehab needs. Patient is recommended to follow-up with PCP in 1 week. Patient is clinically and hemodynamically stable to be discharged today.     Please see above list of diagnoses and related plan for additional information. Condition at Discharge: stable     Discharge Day Visit / Exam:     Subjective:  Patient was seen and examined at bedside. The patient stated she feels a lot better but still has left-sided lower extremity weakness. She denies any headache, blurry vision, chest pain, palpitation, shortness of breath, N/V, abd pain. Vitals: Blood Pressure: 105/65 (08/01/23 0741)  Pulse: 76 (08/01/23 0741)  Temperature: (!) 97.4 °F (36.3 °C) (08/01/23 0741)  Temp Source: Temporal (08/01/23 0741)  Respirations: 18 (08/01/23 0741)  Height: 5' 4" (162.6 cm) (07/31/23 1241)  Weight - Scale: 95 kg (209 lb 7 oz) (07/31/23 1241)  SpO2: 97 % (08/01/23 0741)  Exam:   Physical Exam  General: no acute distress  HEENT: NC/AT, PERRL, EOM - normal  Neck: Supple  Pulm/Chest: Normal chest wall expansion, clear breath sounds on both side  CVS: RRR, normal S1&S2, capillary refill <2s  Abd: soft, non tender, non distended, bowel sounds +  MSK: move all 4 extremities spontaneously  Skin: warm  CNS: Left-sided facial numbness, left upper extremity 4/5, left lower extremity 5/5    Discussion with Family: Discussed with son    Discharge instructions/Information to patient and family:   See after visit summary for information provided to patient and family. Provisions for Follow-Up Care:  See after visit summary for information related to follow-up care and any pertinent home health orders. Disposition:     Home    For Discharges to [facility] SNF:   · Not Applicable to this Patient - Not Applicable to this Patient    Planned Readmission: No     Discharge Statement:  I spent 45 minutes discharging the patient. This time was spent on the day of discharge. I had direct contact with the patient on the day of discharge.  Greater than 50% of the total time was spent examining patient, answering all patient questions, arranging and discussing plan of care with patient as well as directly providing post-discharge instructions. Additional time then spent on discharge activities. Discharge Medications:  See after visit summary for reconciled discharge medications provided to patient and family.       ** Please Note: This note has been constructed using a voice recognition system **

## 2023-08-01 NOTE — NURSING NOTE
IV removed with tip intact. Pressure held to control bleeding. Discharge instructions discussed with [facility]. This includes stroke information and highlighted information specific to TIA in stroke booklet, given in [facility] and [place]. She  verbalizes understanding. Patient left with all their belongings, and discharge instructions.

## 2023-08-01 NOTE — OCCUPATIONAL THERAPY NOTE
Occupational Therapy Evaluation     Patient Name: Andres Hansen  RRMNR'X Date: 2023  Problem List  Principal Problem:    TIA (transient ischemic attack)    Past Medical History  History reviewed. No pertinent past medical history. Past Surgical History  Past Surgical History:   Procedure Laterality Date     SECTION      CHOLECYSTECTOMY      GASTRIC BYPASS      TUBAL LIGATION               23 0948   OT Last Visit   OT Visit Date 23   Note Type   Note type Evaluation   Pain Assessment   Pain Assessment Tool 0-10   Pain Score 4   Pain Location/Orientation Orientation: Left; Location: Head   Pain Onset/Description Onset: Ongoing   Restrictions/Precautions   Weight Bearing Precautions Per Order No   Home Living   Type of Home House   Home Layout Multi-level;Bed/bath upstairs   Bathroom Shower/Tub Tub/shower unit   Bathroom Toilet Standard   Home Equipment   (none)   Prior Function   Level of [place] Independent with ADLs; Independent with IADLS   Lives With Daughter   Receives Help From [facility] in the last 6 months 0   Vocational Full time employment   ADL   Grooming Assistance 7  Independent   UB Bathing Assistance 7  Independent   LB Bathing Assistance 7  Independent   UB Dressing Assistance 7  Independent   LB [address]  [address] [facility]  7  Independent   Transfers   Sit to Stand 5  Supervision   Stand to Sit 5  Supervision   Functional Mobility   Functional Mobility 5  Supervision   Balance   Static Sitting Good   Dynamic Sitting Good   Static Standing Fair +   Dynamic Standing [facility] +   Activity Tolerance   Activity Tolerance Patient tolerated treatment well   RUE Assessment   RUE Assessment WFL   LUE Assessment   LUE Assessment WFL   Hand Function   Gross Motor Coordination Functional   Sensation   Light Touch No apparent deficits   Proprioception   Proprioception No apparent deficits   Vision - Complex Assessment   Ocular Range of Motion Intact   Tracking Intact   Acuity Able to read clock/calendar on wall without difficulty   Psychosocial   Psychosocial (WDL) WDL   Perception   Inattention/Neglect Appears intact   Cognition   Overall Cognitive Status WFL   Arousal/Participation Alert; Cooperative   Attention Within functional limits   Orientation Level Oriented X4   Memory Within functional limits   Following Commands Follows all commands and directions without difficulty   Assessment   Limitation Decreased high-level ADLs   Assessment   Pt is a 47 y.o. female seen for OT evaluation s/p admit to [facility] on 7/31/2023 w/ TIA (transient ischemic attack). See medical history above for extensive list of comorbidities affecting pt's functional performance at time of assessment. Personal factors affecting Pt at time of IE include:health management . Upon evaluation: Pt requires supervision for functional ambulation including bathroom mobility and negotiation of small spaces, supervision/[place] for ADL transfers. Pt able to complete ADLs in standing. Pt's primary barrier(s) at this time: continued LLE weakness which Pt reports as "heaviness," however no noted LOB with functional tasks. Pt's vision WFL, noted equal and symmetrical strength, sensation and proprioception intact. At this time, Pt appears to be at or near her functional baseline. Recommend discharge home with social support. No further acute OT needs at this time. Plan   OT Frequency Eval only   Recommendation   OT Discharge Recommendation No rehabilitation needs   AM-PAC Daily Activity Inpatient   Lower Body Dressing 4   Bathing 4   Toileting 4   Upper Body Dressing 4   Grooming 4   Eating 4   Daily Activity Raw Score 24   Daily Activity Standardized Score (Calc for Raw Score >=11) 57.54      From OT standpoint, recommendation at time of d/c would be home.  The patient's raw score on the AM-PAC Daily Activity inpatient short form low function score is 24, standardized score is 57.54. Patients with a standardized score greater than 39.4 are likely to benefit from discharge to home.

## 2023-08-01 NOTE — PHYSICAL THERAPY NOTE
Physical Therapy Evaluation    Patient's Name: Cady Hernandez    Admitting Diagnosis  Palpitations [R00.2]  Dizziness [R42]  Stroke (cerebrum) ([address]) [I63.9]  Stroke-like symptoms [R29.90]    Problem List  Patient Active Problem List   Diagnosis    Stroke-like symptoms       Past Medical History  History reviewed. No pertinent past medical history. Past Surgical History  Past Surgical History:   Procedure Laterality Date     SECTION      CHOLECYSTECTOMY      GASTRIC BYPASS      TUBAL LIGATION         Recent Imaging  MRI brain wo contrast   Final Result by HELENA Wheeler MD ( [id number])      No acute intracranial pathology. Workstation performed: [id number]         CTA stroke alert (head/neck)   Final Result by Estefani Mills MD ( 1120)      1. Patent major vessels of the [place] without high-grade stenosis. No aneurysm. 2.  No hemodynamically significant stenosis of cervical carotid and vertebral arteries. Findings were directly discussed with Dr. Brooklyn Manning at 11:10 a.m. Workstation performed: [id number]         CT stroke alert brain   Final Result by Estefani Mills MD ( 1117)      No acute intracranial CT abnormality. Findings were directly discussed with Dr. Brooklyn Manning at 11:10 a.m.          Workstation performed: [id number]             Recent Vital Signs  Vitals:    23 2141 23 2341 23 0341 23 0741   BP: 100/68 102/66 105/65 105/65   BP Location: Left arm Left arm Right arm Left arm   Pulse: 68 70 66 76   Resp: 18 16 18 18   Temp: 98.1 °F (36.7 °C) 97.9 °F (36.6 °C) 97.5 °F (36.4 °C) (!) 97.4 °F (36.3 °C)   TempSrc: Temporal Temporal Temporal Temporal   SpO2: 96% 99% 98% 97%   Weight:       Height:            23 0830   PT Last Visit   PT Visit Date 23   Note Type   Note type Evaluation   Pain Assessment   Pain Assessment Tool 0-10   Pain Score 6   Pain Location/Orientation Orientation: Left; Location: Leg   Patient's Stated Pain Goal No pain   Multiple Pain Sites No   Restrictions/Precautions   Weight Bearing Precautions Per Order No   Home Living   Type of [facility]  Multi-level;Stairs to enter with rails;1/2 bath on main level;Bed/bath upstairs  (3 story house with stairs to enter, pt states she will use back door with no stairs to enter. House is 3 stories tall with bathroom on first and second floors, bedroom on third floor.)   Bathroom Shower/Tub Tub/shower unit   Bathroom Toilet Standard   Prior Function   Level of [place] Independent with ADLs; Independent with functional mobility; Independent with IADLS   Lives With Daughter   Sonya Dukes in the last 6 months 0   Vocational Full time employment   General   Family/Caregiver Present No   Cognition   Overall Cognitive Status WFL   Arousal/Participation Alert   Orientation Level Oriented X4   Following Commands Follows all commands and directions without difficulty   RLE Assessment   RLE Assessment WFL  (4/5)   LLE Assessment   LLE Assessment WFL  (4/5, hip flexors 3+/5)   Vision-Basic Assessment   Current Vision Wears glasses all the time   Coordination   Movements are Fluid and Coordinated 1   Sensation X  (Decreased sensation on L foot)   Finger to Nose & Finger to Finger  Intact   Light Touch   RLE Light Touch Grossly intact   LLE Light Touch Impaired   LLE Light Touch Comments light touch sensation on foot, intact above ankle   Bed Mobility   Rolling L 6  Modified independent   Additional items Increased time required   Supine to Sit 6  Modified independent   Additional items Increased time required   Sit to Supine 6  Modified independent   Additional items Increased time required   Transfers   Sit to Stand 6  Modified independent   Additional items Increased time required   Stand to Sit 6  Modified independent   Additional items Increased time required   Ambulation/Elevation   Gait pattern Step through pattern;Decreased toe off;Decreased heel strike;Decreased hip extension;Decreased foot clearance   Gait Assistance 6  Modified independent   Assistive Device None   Distance 250ft   Stair Management Assistance 6  Modified independent   Stair Management Technique Two rails; Reciprocal;Foreward   Number of Stairs 2   Balance   Static Sitting Good   Dynamic Sitting Fair +   Static Standing Fair +   Dynamic Standing Fair +   Ambulatory Fair   Endurance Deficit   Endurance Deficit Yes   Endurance Deficit Description Due to slight deconditioning from decreased activity level   Activity Tolerance   Activity Tolerance Patient tolerated treatment well   Medical Staff Made Aware Spoke to CM   Nurse Made Aware Spoke to RN   Assessment   Prognosis Good   Problem List Decreased strength;Decreased range of motion;Decreased endurance; Impaired balance;Decreased mobility;Pain   Barriers to Discharge Inaccessible home environment;Decreased caregiver support   Goals   Patient Goals None stated   Recommendation   PT Discharge Recommendation No rehabilitation needs   AM-PAC Basic Mobility Inpatient   Turning in Flat Bed Without Bedrails 4   Lying on Back to Sitting on Edge of Flat Bed Without Bedrails 4   Moving Bed to Chair 4   Standing Up From Chair Using Arms 4   Walk in Room 4   Climb 3-5 Stairs With Railing 4   Basic Mobility Inpatient Raw Score 24   Basic Mobility Standardized Score 57.68   Highest Level Of Mobility   -HLM Goal 8: Walk 250 feet or more   JH-HLM Achieved 8: Walk 250 feet ot more   End of Consult   Patient Position at End of Consult Seated edge of bed; All needs within reach     ASSESSMENT                                                                                                                     Stevcody Petit is a 47 y.o. female admitted to [facility] on 7/31/2023 for Stroke-like symptoms. Pt  has no past medical history on file. . PT was consulted and pt was seen on 8/1/2023 for mobility assessment and d/c planning. Currently pt presents with decreased strength , decreased ROM, decreased static standing balance, decreased dynamic standing balance , decreased gait speed, and decreased muscular endurance . Due to these impairments, they are below baseline level for bed mobility, sit to stand , ambulation, stair negotiation, and transfers. Pt is currently functioning at a modified independent assistance level for bed mobility, modified independent assistance level for transfers, modified independent assistance level for ambulation with no assistive device, and modified independent assistance for stair climbing/elevations. These activity limitations significantly impact their ability to participate in previous home and community roles and responsibilities , ambulation in home, and ambulation in the community. The patient's -[facility] Basic Mobility Inpatient Short Form Raw Score is 24. A Raw score of greater than 16 suggests the patient may benefit from discharge to home. They will benefit from skilled therapy to to reduce the risk of falls and to facilitate safe discharge .      DME: None    Discharge Disposition:  Home with no needs    Sandra Jiménez, SPT

## 2023-08-28 DIAGNOSIS — R29.90 STROKE-LIKE SYMPTOMS: ICD-10-CM

## 2023-08-28 RX ORDER — ATORVASTATIN CALCIUM 40 MG/1
TABLET, FILM COATED ORAL
Qty: 90 TABLET | OUTPATIENT
Start: 2023-08-28

## 2023-08-28 NOTE — TELEPHONE ENCOUNTER
Pt never seen at [facility] -- would need appt to establish care in order to refill Rx.